# Patient Record
Sex: FEMALE | NOT HISPANIC OR LATINO | Employment: UNEMPLOYED | URBAN - METROPOLITAN AREA
[De-identification: names, ages, dates, MRNs, and addresses within clinical notes are randomized per-mention and may not be internally consistent; named-entity substitution may affect disease eponyms.]

---

## 2021-01-17 ENCOUNTER — HOSPITAL ENCOUNTER (EMERGENCY)
Facility: CLINIC | Age: 31
Discharge: HOME | End: 2021-01-17
Attending: EMERGENCY MEDICINE | Admitting: EMERGENCY MEDICINE
Payer: COMMERCIAL

## 2021-01-17 VITALS
SYSTOLIC BLOOD PRESSURE: 156 MMHG | DIASTOLIC BLOOD PRESSURE: 93 MMHG | WEIGHT: 256 LBS | OXYGEN SATURATION: 100 % | HEART RATE: 99 BPM | RESPIRATION RATE: 18 BRPM | TEMPERATURE: 99.2 F

## 2021-01-17 DIAGNOSIS — G25.3 MYOCLONUS: ICD-10-CM

## 2021-01-17 DIAGNOSIS — F43.10 POSTTRAUMATIC STRESS DISORDER: ICD-10-CM

## 2021-01-17 DIAGNOSIS — F06.8 INFECTIVE ORGANIC PSYCHOSIS: ICD-10-CM

## 2021-01-17 DIAGNOSIS — G25.3 MYOCLONIC JERKING: ICD-10-CM

## 2021-01-17 DIAGNOSIS — F41.8 DEPRESSION WITH ANXIETY: ICD-10-CM

## 2021-01-17 PROCEDURE — 90791 PSYCH DIAGNOSTIC EVALUATION: CPT

## 2021-01-17 PROCEDURE — 99284 EMERGENCY DEPT VISIT MOD MDM: CPT | Performed by: EMERGENCY MEDICINE

## 2021-01-17 PROCEDURE — 99285 EMERGENCY DEPT VISIT HI MDM: CPT | Mod: 25

## 2021-01-17 RX ORDER — GABAPENTIN 100 MG/1
200 CAPSULE ORAL
COMMUNITY
Start: 2021-01-17 | End: 2022-01-17

## 2021-01-17 RX ORDER — HYDROXYZINE PAMOATE 25 MG/1
25 CAPSULE ORAL 3 TIMES DAILY PRN
Qty: 20 CAPSULE | Refills: 0 | Status: SHIPPED | OUTPATIENT
Start: 2021-01-17

## 2021-01-18 NOTE — DISCHARGE INSTRUCTIONS
Therapy follow-up has been scheduled.  Will also give Vistaril as needed for anxiety.  Continue to take your gabapentin 200 mg 3 times a day.  Titrate higher as directed by your primary care physician.    Follow-up with primary care tomorrow.    I contacted the neurologist on-call, someone will contact you to schedule an outpatient neurology appointment.  Follow-up with the therapy appointment as well.    Return to the ER for any new symptoms as additional evaluation may be warranted at that time.
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
## 2021-01-19 ENCOUNTER — APPOINTMENT (OUTPATIENT)
Dept: INTERPRETER SERVICES | Facility: CLINIC | Age: 31
End: 2021-01-19
Payer: COMMERCIAL

## 2022-04-04 LAB
HBA1C MFR BLD: 5.7 % (ref 4.8–5.6)
HEP C HIM: NORMAL
HIV 1&2 EXT: NORMAL
TSH SERPL-ACNC: 0.2 UIU/ML (ref 0.45–4.5)

## 2022-04-07 ENCOUNTER — TRANSFERRED RECORDS (OUTPATIENT)
Dept: HEALTH INFORMATION MANAGEMENT | Facility: CLINIC | Age: 32
End: 2022-04-07
Payer: COMMERCIAL

## 2022-04-11 ENCOUNTER — NURSE TRIAGE (OUTPATIENT)
Dept: NURSING | Facility: CLINIC | Age: 32
End: 2022-04-11
Payer: COMMERCIAL

## 2022-04-12 NOTE — TELEPHONE ENCOUNTER
Patient reports right sided chest pain, pain goes to right neck and right shoulder, pain has been present all day today    Patient reports that pain level is 4-5/10    Patient reports that she was recently diagnosed with high blood pressure and did not take blood pressure pills today, feels that the blood pressure medication is causing symptoms so she decided not to take today's dosage.    States blood pressure is at 165/110      According to the protocol, patient should call 911, patient decline 911 and stated that she will have her Mom take her to the emergency room.     Shantell Victor RN  04/11/22 7:43 PM  [facility] Nurse Advisor        Reason for Disposition    [1] Chest pain lasts > 5 minutes AND [2] age > 30 AND [3] one or more cardiac risk factors (e.g., diabetes, high blood pressure, high cholesterol, smoker, or strong family history of heart disease)    Additional Information    Negative: SEVERE difficulty breathing (e.g., struggling for each breath, speaks in single words)    Negative: Difficult to awaken or acting confused (e.g., disoriented, slurred speech)    Negative: Shock suspected (e.g., cold/pale/clammy skin, too weak to stand, low BP, rapid pulse)    Negative: Passed out (i.e., fainted, collapsed and was not responding)    Negative: [1] Chest pain lasts > 5 minutes AND [2] age > 44    Protocols used: CHEST PAIN-A-

## 2022-04-25 ENCOUNTER — TRANSFERRED RECORDS (OUTPATIENT)
Dept: HEALTH INFORMATION MANAGEMENT | Facility: CLINIC | Age: 32
End: 2022-04-25

## 2022-04-25 LAB — TSH SERPL-ACNC: 0.28 UIU/ML (ref 0.45–4.5)

## 2022-05-07 ENCOUNTER — HEALTH MAINTENANCE LETTER (OUTPATIENT)
Age: 32
End: 2022-05-07

## 2022-07-26 ENCOUNTER — LAB REQUISITION (OUTPATIENT)
Dept: LAB | Facility: CLINIC | Age: 32
End: 2022-07-26
Payer: COMMERCIAL

## 2022-07-26 PROCEDURE — 88341 IMHCHEM/IMCYTCHM EA ADD ANTB: CPT | Mod: 26 | Performed by: PATHOLOGY

## 2022-07-26 PROCEDURE — 88342 IMHCHEM/IMCYTCHM 1ST ANTB: CPT | Mod: 26 | Performed by: PATHOLOGY

## 2022-07-26 PROCEDURE — 88305 TISSUE EXAM BY PATHOLOGIST: CPT | Mod: 26 | Performed by: PATHOLOGY

## 2022-07-26 PROCEDURE — 88305 TISSUE EXAM BY PATHOLOGIST: CPT | Mod: TC,ORL | Performed by: OBSTETRICS & GYNECOLOGY

## 2022-07-29 LAB
PATH REPORT.COMMENTS IMP SPEC: NORMAL
PATH REPORT.COMMENTS IMP SPEC: NORMAL
PATH REPORT.FINAL DX SPEC: NORMAL
PATH REPORT.GROSS SPEC: NORMAL
PATH REPORT.MICROSCOPIC SPEC OTHER STN: NORMAL
PATH REPORT.MICROSCOPIC SPEC OTHER STN: NORMAL
PATH REPORT.RELEVANT HX SPEC: NORMAL
PHOTO IMAGE: NORMAL

## 2022-08-30 NOTE — PROGRESS NOTES
Outcome for 08/30/22 3:42 PM: Mychart message sent  Tere Castrejon, VF  Outcome for 08/31/22 3:50 PM: Left Voicemail   Sarina Ovalle, VF  Outcome for 09/01/22 11:56 AM: Left Voicemail   Sarina Ovalle, VF  Outcome for 09/02/22 11:30 AM: Attempted to reach patient via telephone, unable to reach. Mychart message sent  Taylor Myhre, VF

## 2022-09-06 ENCOUNTER — VIRTUAL VISIT (OUTPATIENT)
Dept: ENDOCRINOLOGY | Facility: CLINIC | Age: 32
End: 2022-09-06
Payer: COMMERCIAL

## 2022-09-06 DIAGNOSIS — E05.90 SUBCLINICAL HYPERTHYROIDISM: Primary | ICD-10-CM

## 2022-09-06 DIAGNOSIS — R03.0 BLOOD PRESSURE ELEVATED WITHOUT HISTORY OF HTN: ICD-10-CM

## 2022-09-06 DIAGNOSIS — R73.03 PREDIABETES: ICD-10-CM

## 2022-09-06 PROCEDURE — 99244 OFF/OP CNSLTJ NEW/EST MOD 40: CPT | Mod: 95 | Performed by: INTERNAL MEDICINE

## 2022-09-06 RX ORDER — ALBUTEROL SULFATE 90 UG/1
AEROSOL, METERED RESPIRATORY (INHALATION)
COMMUNITY
Start: 2022-06-29

## 2022-09-06 RX ORDER — ALBUTEROL SULFATE 90 UG/1
2 AEROSOL, METERED RESPIRATORY (INHALATION)
COMMUNITY
Start: 2022-06-29

## 2022-09-06 NOTE — PATIENT INSTRUCTIONS
[place],    Please complete blood work.   Orders Placed This Encounter   Procedures    US Thyroid    TSH    T4 free    T3 total    Thyroid stimulating immunoglobulin    Hemoglobin A1c    Basic metabolic panel    Lab scheduling to schedule at any [place] lab locations: [phone] select option 1

## 2022-09-06 NOTE — LETTER
9/6/2022         RE: Chica Almeida  [address]  [place] [address]        Dear Colleague,    Thank you for referring your patient, Chica Almeida, to the [facility]. Please see a copy of my visit note below.    Outcome for 08/30/22 3:42 PM: [employer] message sent  Tere Lucía, DIONNE  Outcome for 08/31/22 3:50 PM: Left Voicemail   Sarina Edlund, VF  Outcome for 09/01/22 11:56 AM: Left Voicemail   Sarina Edlund, VF  Outcome for 09/02/22 11:30 AM: Attempted to reach patient via telephone, unable to reach. [employer] message sent  Taylor Myhre, DIONNE          Chica Almeida is being evaluated via a billable video visit.        How would you like to obtain your AVS? [employer]  For the video visit, send the invitation by: Send to e-mail at: [email or web address]  Will anyone else be joining your video visit? No      Endocrinology Clinic Visit    Chief Complaint: thyroid and prediabetes.     Information obtained from:Patient      Assessment/Treatment Plan:      Subclinical hyperthyroidism: currently euthyroid. Labs from five months ago.  Check TFT, TSI and thyroid ultrasound based on report of long standing thyroid enlargement. Further plan based on results.     Prediabetes:   Weight loss and exercise are recommended in the management of Prediabetes.  Caloric restriction, portion control and physical activity are evidence based strategies for life style changes. Here is a starting point in your case:       Reduced calorie meal plan; set a goal eg 5-10% weight in the next six months.     Please keep a food journal of what you eat, calories in what you eat; this helps to track.     Consider using applications for smart phones such as [employer], [employer], [employer], LoseIt, Tap&Track, and RelaxM. To keep food journal and track your exercise.     Focus on wet volumetrics, meaning, eat more foods that are high in water and fiber such as fruits and  vegetables in order to get that full feeling. These are also good for your overall health as well.    Progressively increase physical activity to 45 minutes, including combination of cardio & resistance training x 5 days weekly. Start at 10-15 minutes per day and progressively work towards this goal.     If persistent elevation in A1c; will consider metformin.        History of elevated blood pressure without history of hypertension -,check thyroid as above and BMP. Monitor blood pressure at home and please let us know if persistently above 130/80.     Return to clinic in 6 months.    Test and/or medications prescribed today:  Orders Placed This Encounter   Procedures     US Thyroid     TSH     T4 free     T3 total     Thyroid stimulating immunoglobulin     Hemoglobin A1c     Basic metabolic panel         Duane Ferreira MD  Staff Endocrinologist    Division of Endocrinology and Diabetes      Subjective:         HPI: Chica Almeida is a 32 year old female with history of subclinical hyperthyroidism and prediabetes who is seen in consultation at Keri Rivera's request for the same.       Referred for the following abnormal labs. No hyperthyroids symptoms including  anxiety, emotional lability, weakness, tremor, palpitations, heat intolerance, increased perspiration, and weight loss despite a normal or increased appetite   Currently not pregnant and not planning in the near future.       Component Ref Range & Units 5 mo ago    TSH (External) 0.450 - 4.500 uIU/mL 0.195 Abnormal           No hyperglycemia symptoms.     Component Ref Range & Units 5 mo ago    Hemoglobin A1C (External) 4.8 - 5.6 % 5.7 Abnormal             History of documented high blood pressure. Not currently on blood pressure medication.   BP Readings from Last 3 Encounters:   01/17/21 (!) 156/93   10/14/14 139/89   01/05/11 124/64     Allergies   Allergen Reactions     Penicillins Swelling and Rash       Current Outpatient  Medications   Medication Sig Dispense Refill     albuterol (PROAIR HFA/PROVENTIL HFA/VENTOLIN HFA) 108 (90 Base) MCG/ACT inhaler Inhale 2 puffs into the lungs       levonorgestrel (MIRENA) 20 MCG/24HR IUD 1 each by Intrauterine route once       hydrOXYzine (VISTARIL) 25 MG capsule Take 1 capsule (25 mg) by mouth 3 times daily as needed for anxiety (Patient not taking: Reported on 9/6/2022) 20 capsule 0     NO ACTIVE MEDICATIONS  (Patient not taking: Reported on 9/6/2022)       Prenatal Vitamins (DIS) TABS Take 1 tablet by mouth daily. (Patient not taking: Reported on 9/6/2022) 100 tablet 4     PROAIR  (90 Base) MCG/ACT inhaler TAKE 2 PUFFS BY MOUTH EVERY 4 HOURS AS NEEDED FOR WHEEZE         Review of Systems      as per HPI above      Objective:   GENERAL: alert and no distress  EYES: Eyes grossly normal to inspection.  No discharge or erythema, or obvious scleral/conjunctival abnormalities.  RESP: No audible wheeze, cough, or visible cyanosis.  No visible retractions or increased work of breathing.    SKIN: Visible skin clear.   NEURO: Cranial nerves grossly intact.  Mentation and speech appropriate for age.  PSYCH: Mentation appears normal, affect normal/bright, judgement and insight intact, normal speech and appearance well-groomed.  In House Labs:   Outside labs reviewed on careeverywhere.     This note has been dictated using voice recognition software.  As a result, there may be errors in the documentation that have gone undetected.  Please consider this when interpreting information in this documentation.    Video start: 12:374Telephone end: 1:00   Both telephone and video used for this visit due to technical difficulties.       Again, thank you for allowing me to participate in the care of your patient.        Sincerely,        Duane Ferreira MD

## 2022-09-06 NOTE — PROGRESS NOTES
Chica Ernst Juan Pablo is being evaluated via a billable video visit.        How would you like to obtain your AVS? [email or web address]  For the video visit, send the invitation by: Send to e-mail at: [email or web address]  Will anyone else be joining your video visit? No

## 2022-09-07 ENCOUNTER — TELEPHONE (OUTPATIENT)
Dept: ENDOCRINOLOGY | Facility: CLINIC | Age: 32
End: 2022-09-07

## 2022-09-07 NOTE — PROGRESS NOTES
Endocrinology Clinic Visit    Chief Complaint: thyroid and prediabetes.     Information obtained from:Patient      Assessment/Treatment Plan:      Subclinical hyperthyroidism: currently euthyroid. Labs from five months ago.  Check TFT, TSI and thyroid ultrasound based on report of long standing thyroid enlargement. Further plan based on results.     Prediabetes:   Weight loss and exercise are recommended in the management of Prediabetes.  Caloric restriction, portion control and physical activity are evidence based strategies for life style changes. Here is a starting point in your case:       Reduced calorie meal plan; set a goal eg 5-10% weight in the next six months.     Please keep a food journal of what you eat, calories in what you eat; this helps to track.     Consider using applications for smart phones such as [employer], [employer], Eat, LoseIt, Tap&Track, and RelaxM. To keep food journal and track your exercise.     Focus on wet volumetrics, meaning, eat more foods that are high in water and fiber such as fruits and vegetables in order to get that full feeling. These are also good for your overall health as well.    Progressively increase physical activity to 45 minutes, including combination of cardio & resistance training x 5 days weekly. Start at 10-15 minutes per day and progressively work towards this goal.     If persistent elevation in A1c; will consider metformin.        History of elevated blood pressure without history of hypertension -,check thyroid as above and BMP. Monitor blood pressure at home and please let us know if persistently above 130/80.     Return to clinic in 6 months.    Test and/or medications prescribed today:  Orders Placed This Encounter   Procedures     US Thyroid     TSH     T4 free     T3 total     Thyroid stimulating immunoglobulin     Hemoglobin A1c     Basic metabolic panel         Duane Ferreira MD  Staff Endocrinologist    Division of Endocrinology  and Diabetes      Subjective:         HPI: Chica Almeida is a 32 year old female with history of subclinical hyperthyroidism and prediabetes who is seen in consultation at Keri Rivera's request for the same.       Referred for the following abnormal labs. No hyperthyroids symptoms including  anxiety, emotional lability, weakness, tremor, palpitations, heat intolerance, increased perspiration, and weight loss despite a normal or increased appetite   Currently not pregnant and not planning in the near future.       Component Ref Range & Units 5 mo ago    TSH (External) 0.450 - 4.500 uIU/mL 0.195 Abnormal           No hyperglycemia symptoms.     Component Ref Range & Units 5 mo ago    Hemoglobin A1C (External) 4.8 - 5.6 % 5.7 Abnormal             History of documented high blood pressure. Not currently on blood pressure medication.   BP Readings from Last 3 Encounters:   01/17/21 (!) 156/93   10/14/14 139/89   01/05/11 124/64     Allergies   Allergen Reactions     Penicillins Swelling and Rash       Current Outpatient Medications   Medication Sig Dispense Refill     albuterol (PROAIR HFA/PROVENTIL HFA/VENTOLIN HFA) 108 (90 Base) MCG/ACT inhaler Inhale 2 puffs into the lungs       levonorgestrel (MIRENA) 20 MCG/24HR IUD 1 each by Intrauterine route once       hydrOXYzine (VISTARIL) 25 MG capsule Take 1 capsule (25 mg) by mouth 3 times daily as needed for anxiety (Patient not taking: Reported on 9/6/2022) 20 capsule 0     NO ACTIVE MEDICATIONS  (Patient not taking: Reported on 9/6/2022)       Prenatal Vitamins (DIS) TABS Take 1 tablet by mouth daily. (Patient not taking: Reported on 9/6/2022) 100 tablet 4     PROAIR  (90 Base) MCG/ACT inhaler TAKE 2 PUFFS BY MOUTH EVERY 4 HOURS AS NEEDED FOR WHEEZE         Review of Systems      as per HPI above      Objective:   GENERAL: alert and no distress  EYES: Eyes grossly normal to inspection.  No discharge or erythema, or obvious scleral/conjunctival  abnormalities.  RESP: No audible wheeze, cough, or visible cyanosis.  No visible retractions or increased work of breathing.    SKIN: Visible skin clear.   NEURO: Cranial nerves grossly intact.  Mentation and speech appropriate for age.  PSYCH: Mentation appears normal, affect normal/bright, judgement and insight intact, normal speech and appearance well-groomed.  In House Labs:   Outside labs reviewed on careeverwhere.     This note has been dictated using voice recognition software.  As a result, there may be errors in the documentation that have gone undetected.  Please consider this when interpreting information in this documentation.    Video start: 12:374Telephone end: 1:00   Both telephone and video used for this visit due to technical difficulties.

## 2022-09-07 NOTE — TELEPHONE ENCOUNTER
Left Voicemail (1st Attempt) for the patient to call back and schedule the following:    Appointment type: return   Provider: dr. escalante   Return date: 3//2023   Specialty phone number: [phone]   Additional appointment(s) needed: ultrasound prior   Additonal Notes: schedule thyroid ultrasound and returnin about 6 months around 3/6/2023     Josi mcgowan Procedure   Orthopedics, Podiatry, Sports Medicine, ENT/Eye Specialties  [facility]   [phone]

## 2022-09-08 ENCOUNTER — TELEPHONE (OUTPATIENT)
Dept: ENDOCRINOLOGY | Facility: CLINIC | Age: 32
End: 2022-09-08

## 2022-09-08 NOTE — TELEPHONE ENCOUNTER
LVM   - ultrasound needs to be scheduled.  -f/up appt with  in 6 months.  -Needs labs scheduled also.  Endo and radiology numbers given.   Taylor Myhre,KENDRICK

## 2022-09-14 ENCOUNTER — TELEPHONE (OUTPATIENT)
Dept: ENDOCRINOLOGY | Facility: CLINIC | Age: 32
End: 2022-09-14

## 2022-09-14 NOTE — TELEPHONE ENCOUNTER
Left Voicemail (1st Attempt) for the patient to call back and schedule the following:    Appointment type: return   Provider: dr. escalante   Return date: 3/6/2023   Specialty phone number: [phone]   Additional appointment(s) needed: ultrasound prior   Additonal Notes: Return in about 6 months (around 3/6/2023    Josi mcgowan Procedure   Orthopedics, Podiatry, Sports Medicine, ENT/Eye Specialties  [facility] and [facility]   [phone]

## 2022-09-20 NOTE — TELEPHONE ENCOUNTER
Left Voicemail (2nd Attempt) for the patient to call back and schedule the following:    Appointment type: return   Provider: dr. escalante   Return date: 3/6/2023   Specialty phone number: [phone]   Additional appointment(s) needed: ultrasound prior   Additonal Notes: Return in about 6 months (around 3/6/2023    Josi mcgowan Procedure   Orthopedics, Podiatry, Sports Medicine, ENT/Eye Specialties  [facility] and [facility]   [phone]

## 2023-01-07 ENCOUNTER — HEALTH MAINTENANCE LETTER (OUTPATIENT)
Age: 33
End: 2023-01-07

## 2023-06-02 ENCOUNTER — HEALTH MAINTENANCE LETTER (OUTPATIENT)
Age: 33
End: 2023-06-02

## 2023-06-06 ENCOUNTER — TELEPHONE (OUTPATIENT)
Dept: ENDOCRINOLOGY | Facility: CLINIC | Age: 33
End: 2023-06-06

## 2023-06-06 NOTE — TELEPHONE ENCOUNTER
Patient overdue for follow up with Dr Ferreira. Writer called and left voice message to call back for appointment scheduling. When patient calls back, please schedule labs and thyroid ultrasound at least 2-3 days prior.    ROSELINE Anand  Adult Endocrinology  [facility]

## 2024-06-29 ENCOUNTER — HEALTH MAINTENANCE LETTER (OUTPATIENT)
Age: 34
End: 2024-06-29

## 2025-07-13 ENCOUNTER — HEALTH MAINTENANCE LETTER (OUTPATIENT)
Age: 35
End: 2025-07-13